# Patient Record
Sex: FEMALE | Race: BLACK OR AFRICAN AMERICAN
[De-identification: names, ages, dates, MRNs, and addresses within clinical notes are randomized per-mention and may not be internally consistent; named-entity substitution may affect disease eponyms.]

---
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
## 2022-09-21 ENCOUNTER — HOSPITAL ENCOUNTER (EMERGENCY)
Dept: HOSPITAL 47 - EC | Age: 22
Discharge: OTHER INSTITUTION | End: 2022-09-21
Payer: COMMERCIAL

## 2022-09-21 VITALS — RESPIRATION RATE: 20 BRPM | SYSTOLIC BLOOD PRESSURE: 135 MMHG | DIASTOLIC BLOOD PRESSURE: 101 MMHG

## 2022-09-21 VITALS — HEART RATE: 88 BPM | TEMPERATURE: 97.5 F

## 2022-09-21 DIAGNOSIS — F17.200: ICD-10-CM

## 2022-09-21 DIAGNOSIS — F32.A: Primary | ICD-10-CM

## 2022-09-21 PROCEDURE — 82075 ASSAY OF BREATH ETHANOL: CPT

## 2022-09-21 PROCEDURE — 99284 EMERGENCY DEPT VISIT MOD MDM: CPT

## 2022-09-21 PROCEDURE — 80306 DRUG TEST PRSMV INSTRMNT: CPT

## 2022-09-21 NOTE — ED
General Adult HPI





- General


Chief complaint: Psychiatric Symptoms


Stated complaint: Petitioned


Time Seen by Provider: 09/21/22 17:52


Source: patient, RN notes reviewed, old records reviewed


Mode of arrival: ambulatory


Limitations: no limitations





- History of Present Illness


Initial comments: 





21-year-old female for mental health evaluation.  Patient has been petitioned by

local police after they were called with suicidal behavior suicidal threats, she

did have some covering for both her upper extremities and lower extremities is 

are superficial in nature.  Patient had told officers that she no longer wants 

to live.  She does admit to both marijuana and alcohol use.,  Cooperative with 

my evaluation.





- Related Data


                                  Previous Rx's











 Medication  Instructions  Recorded


 


Loratadine [Claritin] 10 mg PO DAILY 7 Days #7 tab 12/28/19


 


Lidocaine Viscous 2% [Xylocaine 5 ml MUCOUS MEM BID #60 ml 06/11/21





Viscous]  











                                    Allergies











Allergy/AdvReac Type Severity Reaction Status Date / Time


 


No Known Allergies Allergy   Verified 09/21/22 17:35














Review of Systems


ROS Statement: 


Those systems with pertinent positive or pertinent negative responses have been 

documented in the HPI.





ROS Other: All systems not noted in ROS Statement are negative.





Past Medical History


Past Medical History: No Reported History


History of Any Multi-Drug Resistant Organisms: None Reported


Past Surgical History: Adenoidectomy, Ear Surgery, Tonsillectomy


Additional Past Surgical History / Comment(s): ear x4


Past Psychological History: Anxiety, Depression


Smoking Status: Current every day smoker


Past Alcohol Use History: Occasional


Past Drug Use History: Marijuana





General Exam


Limitations: no limitations


General appearance: alert, in no apparent distress


Head exam: Present: atraumatic, normocephalic


Eye exam: Present: normal appearance, PERRL


ENT exam: Present: normal exam


Respiratory exam: Present: normal lung sounds bilaterally.  Absent: respiratory 

distress, wheezes


Cardiovascular Exam: Present: regular rate, normal rhythm


GI/Abdominal exam: Present: soft.  Absent: distended, tenderness


Extremities exam: Present: other (Superficial lacerations bilateral upper forear

ms, bilateral thighs.  No repairable laceration.)


Neurological exam: Present: alert, oriented X3, CN II-XII intact.  Absent: motor

sensory deficit


Psychiatric exam: Present: depressed, flat affect, suicidal ideation


Skin exam: Present: warm, dry





Course


                                   Vital Signs











  09/21/22





  17:32


 


Temperature 97.5 F L


 


Pulse Rate 88


 


Respiratory 16





Rate 


 


Blood Pressure 142/92


 


O2 Sat by Pulse 99





Oximetry 














- Reevaluation(s)


Reevaluation #1: 





09/21/22 18:29


Cleared for EPS.





Medical Decision Making





- Medical Decision Making





21-year-old female who had presented for mental health evaluation.  Patient was 

evaluated by EPS.  She had been offered admission for inpatient psychiatric 

evaluation treatment but she declined.  She is not feeling suicidal.  She has 

signed a safety plan.  She has an appointment with [facility] on 

Friday which is 2 days from now.  She has an appointment in October for 

evaluation by psychiatrist for possible medication.  She is feeling much better,

and contracts to safety.





Disposition


Clinical Impression: 


 Depression





Disposition: ADMITTED AS IP TO THIS HOSP


Condition: Fair


Instructions (If sedation given, give patient instructions):  Depression (ED)


Additional Instructions: 


Please return to the emergency department with worsening or changing symptoms.  

Please follow up with [facility].


Is patient prescribed a controlled substance at d/c from ED?: No


Referrals: 


None,Stated [Primary Care Provider] - 1-2 days


Time of Disposition: 20:58